# Patient Record
(demographics unavailable — no encounter records)

---

## 2025-04-30 NOTE — DISCUSSION/SUMMARY
[de-identified] : 16f with medial left knee pain and limited flexion with guarding on exam.  The patient was advised of the diagnosis. The natural history of the pathology was explained in full to the patient in layman's terms. All questions were answered.  1) MRI left knee, eval meniscal tear 2) cryotherapy, rest and activity modification  3) c/w playmaker brace. use of crutches prn  4) out of gym and sports 5) rtc after MRI   Entered by Delaney Dick acting as scribe. Dr. Martinez- The documentation recorded by the scribe accurately reflects the service I personally performed and the decisions made by me.

## 2025-04-30 NOTE — IMAGING
[Left] : left knee [All Views] : anteroposterior, lateral, skyline, and anteroposterior standing [There are no fractures, subluxations or dislocations. No significant abnormalities are seen] : There are no fractures, subluxations or dislocations. No significant abnormalities are seen [FreeTextEntry9] : independent interpretation on 04/30/2025

## 2025-04-30 NOTE — HISTORY OF PRESENT ILLNESS
[de-identified] : 04/30/2025 Ms. 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 While playing travel soccer she fell onto the knee. Knee pain is anterior, posterior and associated with swelling, limited ROM, weakness. Has been using playmaker brace from a prior injury and has been modifying activity. Has also tried ice, NSAIDs.   H/O left patella contusion, prepatellar bursitis 12/2024 Dr. Beal

## 2025-04-30 NOTE — PHYSICAL EXAM
[Left] : left knee [NL (0)] : extension 0 degrees [Positive] : positive Salvador [] : patient ambulates without assistive device [TWNoteComboBox7] : flexion 60 degrees

## 2025-05-14 NOTE — DATA REVIEWED
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

## 2025-05-14 NOTE — HISTORY OF PRESENT ILLNESS
[de-identified] : 05/13/25: Pt here for L knee F/U and to review MRI results performed at [facility] on 05/07/25. Pt reports no change in pain since initial visit.  04/30/2025 Ms. LINO ORTIZ, a 16 year old female ([employer] 11th grade soccer, volleyball), presents today for left knee pain since 4/24/25. While playing travel soccer she fell onto the knee. Knee pain is anterior, posterior and associated with swelling, limited ROM, weakness. Has been using playmaker brace from a prior injury and has been modifying activity. Has also tried ice, NSAIDs.   H/O left patella contusion, prepatellar bursitis 12/2024 Dr. Beal
